# Patient Record
Sex: MALE | Race: WHITE | NOT HISPANIC OR LATINO | Employment: FULL TIME | URBAN - METROPOLITAN AREA
[De-identification: names, ages, dates, MRNs, and addresses within clinical notes are randomized per-mention and may not be internally consistent; named-entity substitution may affect disease eponyms.]

---

## 2020-08-14 ENCOUNTER — HOSPITAL ENCOUNTER (EMERGENCY)
Facility: MEDICAL CENTER | Age: 45
End: 2020-08-14
Attending: EMERGENCY MEDICINE
Payer: COMMERCIAL

## 2020-08-14 VITALS
DIASTOLIC BLOOD PRESSURE: 83 MMHG | HEART RATE: 78 BPM | WEIGHT: 213.41 LBS | TEMPERATURE: 98.2 F | RESPIRATION RATE: 18 BRPM | OXYGEN SATURATION: 100 % | BODY MASS INDEX: 30.55 KG/M2 | SYSTOLIC BLOOD PRESSURE: 120 MMHG | HEIGHT: 70 IN

## 2020-08-14 DIAGNOSIS — S69.91XA INJURY TO FINGERNAIL OF RIGHT HAND, INITIAL ENCOUNTER: ICD-10-CM

## 2020-08-14 PROCEDURE — 11730 AVULSION NAIL PLATE SIMPLE 1: CPT | Mod: EDC

## 2020-08-14 PROCEDURE — 99282 EMERGENCY DEPT VISIT SF MDM: CPT | Mod: EDC

## 2020-08-14 NOTE — ED PROVIDER NOTES
"ED Provider Note    Scribed for Hector Coronado M.D. by Kwesi Goodman. 8/14/2020  8:30 AM    Primary care provider: None noted  Means of arrival: Walk-in  History obtained from: Patient  History limited by: None    CHIEF COMPLAINT  Chief Complaint   Patient presents with   • Finger Pain     Pt slammed his R thumb in a door and now the finger nail is starting to lift.       HPI  Brandt Patricio is a 45 y.o. male who presents to the Emergency Department for right thumb pain onset after the patient injured it approximately a month ago. Patient adds that he believes the nail is beginning to come off. Patient states that he is worried about the nail coming off at work. Patient is currently afebrile.    PPE Note: I personally donned full PPE for all patient encounters during this visit, including being clean-shaven with a mask.     Scribe remained outside the patient's room and did not have any contact with the patient for the duration of patient encounter.       REVIEW OF SYSTEMS  Pertinent positives include finger pain.   Pertinent negatives include no fever.     See HPI for further details.     PAST MEDICAL HISTORY  None noted    SURGICAL HISTORY  patient denies any surgical history    SOCIAL HISTORY  Social History     Tobacco Use   • Smoking status: Never Smoker   • Smokeless tobacco: Never Used   Substance Use Topics   • Alcohol use: Yes     Comment: occ   • Drug use: No      Social History     Substance and Sexual Activity   Drug Use No       FAMILY HISTORY  History reviewed. No pertinent family history.    CURRENT MEDICATIONS  Home Medications     Reviewed by Valentina Chun R.N. (Registered Nurse) on 08/14/20 at 0804  Med List Status: Complete   Medication Last Dose Status        Patient Mario Taking any Medications                       ALLERGIES  No Known Allergies    PHYSICAL EXAM  VITAL SIGNS: /79   Pulse 77   Temp 36.1 °C (97 °F) (Temporal)   Resp 16   Ht 1.778 m (5' 10\")   Wt 96.8 kg " (213 lb 6.5 oz)   SpO2 97%   BMI 30.62 kg/m²   Nursing note and vitals reviewed.  Constitutional: No distress.   HENT: Head is atraumatic. Oropharynx is moist.   Eyes: Conjunctivae are normal. Pupils are equal, round, and reactive to light.   Cardiovascular: Normal peripheral perfusion  Respiratory: No respiratory distress.   Musculoskeletal: Normal range of motion.   Neurological: Alert. No focal deficits noted.    Skin: No rash.  The right thumbnail is blackened, there is an old subungual hematoma, there is some lifting of the proximal portion of the nail, the nail is adherent to the distal portion of the nailbed.  Psych: Appropriate for clinical situation     DIAGNOSTIC STUDIES / PROCEDURES    Anesthesia was not required, I used a scalpel and forceps to release the adherent tissue from the proximal portion of the nail and from the distal portion of the nailbed.  Patient tolerated this well.  No significant pain.  No significant bleeding.  Nail was fully removed.    COURSE & MEDICAL DECISION MAKING  Nursing notes, VS, PMSFHx reviewed in chart.     8:30 AM - Patient seen and examined at bedside. Patient's nail was removed at bedside.     The patient will return for new or worsening symptoms and is stable at the time of discharge.    The patient is referred to a primary physician for blood pressure management, diabetic screening, and for all other preventative health concerns.    DISPOSITION:  Patient will be discharged home in stable condition.    FOLLOW UP:  Will follow up with [facility] ED if symptoms worsen.    OUTPATIENT MEDICATIONS:  There are no discharge medications for this patient.      FINAL IMPRESSION  1. Injury to fingernail of right hand, initial encounter          Kwesi CRAWFORD), am scribing for, and in the presence of, Hector Coronado M.D..    Electronically signed by: Kwesi Kenney), 8/14/2020    Hector CRAWFORD M.D. personally performed the services described in this  documentation, as scribed by Kwesi Goodman in my presence, and it is both accurate and complete. E    The note accurately reflects work and decisions made by me.  Hector Coronado M.D.  8/14/2020  2:21 PM

## 2020-08-14 NOTE — ED NOTES
Polysporin and band-aid applied. Pt left ED ambulatory and in NAD. Discharge instructions discussed with pt, as well as importance of follow up care as needed, verbalized understanding.

## 2020-08-14 NOTE — ED NOTES
Chief Complaint   Patient presents with   • Finger Pain     Pt slammed his R thumb in a door and now the finger nail is starting to lift.       Pt to room in no distress. Chart up for ERP [place].